# Patient Record
(demographics unavailable — no encounter records)

---

## 2024-12-28 NOTE — DATA REVIEWED
[FreeTextEntry1] : X-ray images were obtained at the hospital.  Images of the right wrist reveal no acute fractures, dislocations, bony abnormalities.
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
## 2024-12-28 NOTE — PHYSICAL EXAM
[de-identified] : Physical exam right wrist: No erythema, ecchymosis.  Mild swelling of the wrist and forearm.  Skin intact.  Tenderness palpation over distal radius, distal ulna, DRUJ, 1st through 5th metacarpals, thumb, index, middle, ring fingers diffusely.  Patient has full extension of the hand and fingers at all joints and full flexion.  Decreased  strength.  Limited flexion and extension of the wrist.  +2 distal radius pulse.  Sensation intact to light touch.

## 2024-12-28 NOTE — DISCUSSION/SUMMARY
[de-identified] : Patient has a sprain of the right wrist and hand.  At this time I gave patient a cock up wrist brace to wear at all times for the next 2 to 3 weeks as tolerated.  Can take off for showering and sleeping.  Muscle prescribing ibuprofen 800 mg to take 2-3 times a day with a full meal for pain and inflammation.  I encourage patient to take this for at least the next 7 to 10 days.  Patient will rest and modify activity based on pain.  I explained this can take 4 to 6 weeks to fully heal.  Will follow-up in approximately 3 to 4 weeks for further assessment as needed.  Patient agrees to above plan all questions were answered today

## 2025-03-24 NOTE — DISCUSSION/SUMMARY
[FreeTextEntry1] : pt does not want to resume taking OCPs, had side effects. Discussed other methods of contraception educational material on Nexplanon given to pt at todays visit, pt also interested in transdermal patch she will call the office when she decides

## 2025-03-24 NOTE — PHYSICAL EXAM
[Chaperone Present] : A chaperone was present in the examining room during all aspects of the physical examination [FreeTextEntry2] : CLAIRE Lara [Appropriately responsive] : appropriately responsive [Alert] : alert [No Acute Distress] : no acute distress [Soft] : soft [Non-tender] : non-tender [Non-distended] : non-distended [Oriented x3] : oriented x3 [Labia Majora] : normal [Labia Minora] : normal [Normal] : normal [Uterine Adnexae] : normal

## 2025-03-24 NOTE — HISTORY OF PRESENT ILLNESS
[Y] : Patient is sexually active [Monogamous (Male Partner)] : is monogamous with a male partner [N] : Patient denies prior pregnancies [MensesFreq] : 28 [MensesLength] : 6 [MensesAmount] : heavy [de-identified] : # of partners -1 [Regular Cycle Intervals] : periods have been regular [Menstrual Cramps] : menstrual cramps [FreeTextEntry1] : 3/11/25 [Currently Active] : currently active [Men] : men [No] : No [Yes] : Yes [Condoms] : Condoms [Patient would like to be screened for STIs] : Patient would like to be screened for STIs

## 2025-06-26 NOTE — PROCEDURE
[Suspected Ovarian Cyst] : suspected ovarian cyst [F/U Abnormal US] : f/u abnormal ultrasound [Transvaginal Ultrasound] : transvaginal ultrasound [Retroverted] : retroverted [L: ___ cm] : L: [unfilled] cm [W: ___cm] : W: [unfilled] cm [H: ___ cm] : H: [unfilled] cm [FreeTextEntry5] : vol 47.85cc [FreeTextEntry7] : 7.72cc [FreeTextEntry8] : 1.55cc  cyst resolved

## 2025-06-26 NOTE — PHYSICAL EXAM
[MA] : MA [FreeTextEntry2] : Bibiana [Appropriately responsive] : appropriately responsive [Alert] : alert [No Acute Distress] : no acute distress [Soft] : soft [Non-tender] : non-tender [Non-distended] : non-distended [Oriented x3] : oriented x3 [Labia Majora] : normal [Labia Minora] : normal [No Bleeding] : There was no active vaginal bleeding [Normal] : normal [Uterine Adnexae] : normal

## 2025-06-26 NOTE — HISTORY OF PRESENT ILLNESS
[FreeTextEntry1] :  Pt presents today for a follow up to a previously seen left ovarian cyst pt c/o severe pain with nausea and vomiting every other month of her cycle, pain was so severe she went to the ED at the beginning of June, LMP 6/1/2025 pt was prescribed Ibuprofen and Zofran, neither helped

## 2025-06-26 NOTE — PLAN
[FreeTextEntry1] : discussed OCP use to manage dysmenorrhea/menorrhagia, pt declined OCP use at todays visit recommended transdermal patch to which she has agreed upon to start, reviewed instructions for starting patch I also discussed Mirena IUD , pt was given educational material at todays visit